# Patient Record
Sex: FEMALE | Race: WHITE | NOT HISPANIC OR LATINO | Employment: FULL TIME | URBAN - METROPOLITAN AREA
[De-identification: names, ages, dates, MRNs, and addresses within clinical notes are randomized per-mention and may not be internally consistent; named-entity substitution may affect disease eponyms.]

---

## 2023-12-19 RX ORDER — HYDROGEN PEROXIDE 3 %
1 SOLUTION, NON-ORAL MISCELLANEOUS DAILY
COMMUNITY
Start: 2017-02-07

## 2023-12-19 RX ORDER — OMEPRAZOLE 20 MG/1
CAPSULE, DELAYED RELEASE ORAL EVERY 24 HOURS
COMMUNITY
Start: 2016-04-13

## 2023-12-19 RX ORDER — VENLAFAXINE HYDROCHLORIDE 150 MG/1
CAPSULE, EXTENDED RELEASE ORAL EVERY 24 HOURS
COMMUNITY

## 2023-12-19 RX ORDER — PHENYLPROPANOLAMINE/CLEMASTINE 75-1.34MG
TABLET, EXTENDED RELEASE ORAL
COMMUNITY

## 2023-12-19 RX ORDER — CLINDAMYCIN PHOSPHATE AND TRETINOIN GEL 1.2%/0.025% 10; .25 MG/G; MG/G
GEL TOPICAL EVERY 24 HOURS
COMMUNITY
Start: 2014-10-08

## 2023-12-19 RX ORDER — GABAPENTIN 600 MG/1
TABLET ORAL EVERY 24 HOURS
COMMUNITY

## 2023-12-19 RX ORDER — MELATONIN 10 MG
CAPSULE ORAL
COMMUNITY

## 2023-12-19 RX ORDER — LEVONORGESTREL 52 MG/1
INTRAUTERINE DEVICE INTRAUTERINE
COMMUNITY

## 2023-12-19 RX ORDER — MONTELUKAST SODIUM 10 MG/1
TABLET ORAL EVERY 24 HOURS
COMMUNITY

## 2023-12-19 RX ORDER — PAROXETINE 10 MG/1
TABLET, FILM COATED ORAL
COMMUNITY
Start: 2016-11-22

## 2023-12-19 RX ORDER — PAROXETINE HYDROCHLORIDE 40 MG/1
TABLET, FILM COATED ORAL EVERY 24 HOURS
COMMUNITY
Start: 2016-09-23

## 2023-12-19 RX ORDER — PANTOPRAZOLE SODIUM 40 MG/1
TABLET, DELAYED RELEASE ORAL EVERY 24 HOURS
COMMUNITY

## 2023-12-21 ENCOUNTER — APPOINTMENT (OUTPATIENT)
Dept: PRIMARY CARE | Facility: CLINIC | Age: 38
End: 2023-12-21
Payer: COMMERCIAL

## 2024-01-03 ENCOUNTER — ALLIED HEALTH (OUTPATIENT)
Dept: INTEGRATIVE MEDICINE | Facility: CLINIC | Age: 39
End: 2024-01-03

## 2024-01-03 DIAGNOSIS — M54.59 OTHER LOW BACK PAIN: Primary | ICD-10-CM

## 2024-01-03 PROCEDURE — 97139 UNLISTED THERAPEUTIC PX: CPT | Performed by: ACUPUNCTURIST

## 2024-01-03 NOTE — PROGRESS NOTES
Acupuncture Visit:     Subjective   Patient ID: Ines Jones is a 38 y.o. female who presents for No chief complaint on file.  Seeking support for right sided low back pain.          Session Information  Is this acupuncture treatment being billed to the patient's insurance company: No  Visit Type: New patient         Review of Systems         Provider reviewed plan for the acupuncture session, precautions and contraindications. Patient/guardian/hospital staff has given consent to treat with full understanding of what to expect during the session. Before acupuncture began, provider explained to the patient to communicate at any time if the procedure was causing discomfort past their tolerance level. Patient agreed to advise acupuncturist. The acupuncturist counseled the patient on the risks of acupuncture treatment including pain, infection, bleeding, and no relief of pain. The patient was positioned comfortably. There was no evidence of infection at the site of needle insertions.    Objective   Physical Exam         Treatment Plan  Treatment Goals: Pain management    Acupuncture Treatment  Patient Position: Seated and reclining  Needle Guage: 36 guage /.20/ Blue seirin  Body Points: With retention  Body Points - Bilateral: 2nd danette,  Body Points - Right: ub 23, 24, km adrenalx3, sijtx2, Hjjl2  Needle Count In: 10  Needle Count Out: 10  Needle Retention Time (min): 25 minutes  Total Face to Face Time (min): 20 minutes              Assessment/Plan

## 2024-01-05 ENCOUNTER — APPOINTMENT (OUTPATIENT)
Dept: PRIMARY CARE | Facility: CLINIC | Age: 39
End: 2024-01-05
Payer: COMMERCIAL

## 2024-01-10 ENCOUNTER — APPOINTMENT (OUTPATIENT)
Dept: INTEGRATIVE MEDICINE | Facility: CLINIC | Age: 39
End: 2024-01-10
Payer: COMMERCIAL

## 2024-01-24 ENCOUNTER — APPOINTMENT (OUTPATIENT)
Dept: INTEGRATIVE MEDICINE | Facility: CLINIC | Age: 39
End: 2024-01-24
Payer: COMMERCIAL

## 2024-01-31 ENCOUNTER — APPOINTMENT (OUTPATIENT)
Dept: INTEGRATIVE MEDICINE | Facility: CLINIC | Age: 39
End: 2024-01-31
Payer: COMMERCIAL

## 2024-02-07 ENCOUNTER — HOSPITAL ENCOUNTER (OUTPATIENT)
Dept: RADIOLOGY | Facility: CLINIC | Age: 39
Discharge: HOME | End: 2024-02-07
Payer: COMMERCIAL

## 2024-02-07 DIAGNOSIS — M53.86 SCIATICA ASSOCIATED WITH DISORDER OF LUMBAR SPINE: ICD-10-CM

## 2024-02-07 PROCEDURE — 72110 X-RAY EXAM L-2 SPINE 4/>VWS: CPT

## 2024-02-07 PROCEDURE — 72110 X-RAY EXAM L-2 SPINE 4/>VWS: CPT | Performed by: RADIOLOGY

## 2024-02-22 ENCOUNTER — OFFICE VISIT (OUTPATIENT)
Dept: OTOLARYNGOLOGY | Facility: CLINIC | Age: 39
End: 2024-02-22
Payer: COMMERCIAL

## 2024-02-22 VITALS — HEIGHT: 60 IN | BODY MASS INDEX: 29.41 KG/M2 | TEMPERATURE: 97.4 F | WEIGHT: 149.8 LBS

## 2024-02-22 DIAGNOSIS — J33.9 NASAL POLYP: ICD-10-CM

## 2024-02-22 DIAGNOSIS — R09.81 NASAL CONGESTION: ICD-10-CM

## 2024-02-22 DIAGNOSIS — J32.9 CHRONIC RHINOSINUSITIS: Primary | ICD-10-CM

## 2024-02-22 DIAGNOSIS — J34.89 NASAL DRAINAGE: ICD-10-CM

## 2024-02-22 PROCEDURE — 1036F TOBACCO NON-USER: CPT | Performed by: NURSE PRACTITIONER

## 2024-02-22 PROCEDURE — 31231 NASAL ENDOSCOPY DX: CPT | Performed by: NURSE PRACTITIONER

## 2024-02-22 PROCEDURE — 99204 OFFICE O/P NEW MOD 45 MIN: CPT | Performed by: NURSE PRACTITIONER

## 2024-02-22 RX ORDER — BREXPIPRAZOLE 0.5 MG/1
0.5 TABLET ORAL DAILY
COMMUNITY
Start: 2024-02-05

## 2024-02-22 RX ORDER — VILOXAZINE HYDROCHLORIDE 200 MG/1
CAPSULE, EXTENDED RELEASE ORAL
COMMUNITY
Start: 2024-02-18

## 2024-02-22 ASSESSMENT — PATIENT HEALTH QUESTIONNAIRE - PHQ9
SUM OF ALL RESPONSES TO PHQ9 QUESTIONS 1 AND 2: 0
1. LITTLE INTEREST OR PLEASURE IN DOING THINGS: NOT AT ALL
2. FEELING DOWN, DEPRESSED OR HOPELESS: NOT AT ALL

## 2024-02-23 RX ORDER — FLUTICASONE PROPIONATE 93 UG/1
SPRAY, METERED NASAL
Qty: 16 ML | Refills: 3 | Status: SHIPPED | OUTPATIENT
Start: 2024-02-23

## 2024-02-23 NOTE — PATIENT INSTRUCTIONS
Use Xhance nasal spray 2 sprays each nostril once daily.  Use at least a saline spray to clean the nose prior to using Xhance.  Follow-up in 3 months.  We will follow nasal polyps and chronic rhinosinusitis protocol if this is not improved in 3 months.

## 2024-02-23 NOTE — PROGRESS NOTES
Subjective   Patient ID: Ines Jones is a 38 y.o. female who presents for Recurrent Sinusitis.  HPI  Patient reports that she has had issues with chronic rhinosinusitis since her adolescence years.  She has had nasal surgery done by Dr. Bernard and  several years ago.  She did very well for a period of at least 10 years but since this past year she has had 3 sinus infection in 1 year.  She has nasal congestion and nasal drainage.  She seems to be worse when she is using saline irrigation.  She feels that the water is getting trapped in the nose and harboring the bacteria that causes sinus infection.  She has tried several oral antihistamine as well as topical steroid nasal sprays which did not help.  She did have immunotherapy in the past and thinking about restarting the IT injections.        Review of Systems      All other systems have been reviewed and are negative for complaints except for those mentioned in history of present illness, past medical history and problem list       Objective   Physical Exam    CONSTITUTIONAL: No acute distress, normal facial features; No fever; no chills  VOICE: No hoarseness or other audible abnormality  RESPIRATION: Breathing comfortably, no stridor; normal breathing effort  CV: No cyanosis visible on the face and neck area  EYES:Pupils equal and round ; no erythema; conjunctiva clear; sclera white  NEURO: Alert and oriented, able to raise eyebrows symmetrical bilateral, smile with no facial droop, able to swallow  HEAD AND FACE: Symmetric facial features, no masses or lesions    Right ear examination: External ear normal.  EAC is clear.  TM intact with no effusion, retraction or perforation.  Left ear examination: External ear normal.  EAC is clear.  TM intact with no effusion, retraction or perforation.    NOSE: External nose midline; inferior nasal turbinates mildly hypertrophied.  Small polyp seen anterior to the middle turbinate.  No purulence.  ORAL CAVITY: No  lesions of external lips; uvula is midline; tongue with good mobility; no gross mass in oral cavity; mucosa appears pink   NECK/LYMPH: No obvious deformity or lesions; trachea is midline  PSYCH: Alert and oriented with appropriate mood and affect.    Patient ID: Ines Jones is a 38 y.o. female.    Procedures    PROCEDURE NOTE:   Procedure: Nasal endoscopy - diagnostic  Indication: concern for chronic sinusitis  Informed Consent obtained: risks, benefits, alternatives, and expectations discussed with pt and the pt wishes to proceed.     Findings: After topical decongestion with decongestant and anesthetic spray, nasal endoscopy was performed using an endoscope. The septum was midline hypertrophied. The inferior turbinates were hypertrophied.  The middle turbinates appeared , surgical changes noted bilaterally.  Nasal polyps noted mostly superiorly above the middle meatus as well as by the frontal sinus.  No purulence seen.  Significant postnasal drainage noted.  The nasal passageway is moderately obstructed by nasal polyps.  The nasopharynx was clear. There were no complications and the patient tolerated the procedure well.         Assessment/Plan       1. Chronic rhinosinusitis        2. Nasal polyp        3. Nasal congestion        4. Nasal drainage          Her nasal endoscopy exam showed significant polyps in bilateral nasal passage mostly by the frontal sinus as well as above the middle middle turbinates.  Noted sinus surgery changes in bilateral nasal passage.  We discussed treatment with tapered prednisone but patient deferred.  I then recommended using Xhance nasal spray which she is more agreeable to use.  She will use Xhance 2 sprays each nostril once daily for 3 months.  Encourage patient to use saline spray in each nostril once daily prior to application of Xhance.  If the nasal polyps are not improved over 3 months then we will follow sinus protocol.    This note was created using speech  recognition transcription software. Despite proofreading, several typographical errors might be present that might affect the meaning of the content. Please call with any questions.           SORAIDA Oden 02/23/24 11:31 AM

## 2024-03-01 ENCOUNTER — OFFICE VISIT (OUTPATIENT)
Dept: ORTHOPEDIC SURGERY | Facility: CLINIC | Age: 39
End: 2024-03-01
Payer: COMMERCIAL

## 2024-03-01 VITALS — BODY MASS INDEX: 27.48 KG/M2 | HEIGHT: 60 IN | WEIGHT: 140 LBS

## 2024-03-01 DIAGNOSIS — M43.06 PARS DEFECT OF LUMBAR SPINE: Primary | ICD-10-CM

## 2024-03-01 DIAGNOSIS — M54.16 LUMBAR RADICULOPATHY: ICD-10-CM

## 2024-03-01 DIAGNOSIS — Q76.49 BERTOLOTTI'S SYNDROME: ICD-10-CM

## 2024-03-01 PROCEDURE — 1036F TOBACCO NON-USER: CPT | Performed by: ORTHOPAEDIC SURGERY

## 2024-03-01 PROCEDURE — 99204 OFFICE O/P NEW MOD 45 MIN: CPT | Performed by: ORTHOPAEDIC SURGERY

## 2024-03-01 ASSESSMENT — PAIN - FUNCTIONAL ASSESSMENT: PAIN_FUNCTIONAL_ASSESSMENT: 0-10

## 2024-03-01 NOTE — LETTER
March 5, 2024     Michael Sinclair DO  [address] [facility] [address]  [facility] [address]    Patient: Ines Jones   YOB: 1985   Date of Visit: 3/1/2024       Dear Dr. Michael Sinclair DO:    Thank you for referring Ines Jones to me for evaluation. Below are my notes for this consultation.  If you have questions, please do not hesitate to call me. I look forward to following your patient along with you.       Sincerely,     Dylan Ramos MD      CC: No Recipients  ______________________________________________________________________________________    HPI:Ines Jones is a pleasant 38-year-old woman who comes in with complaints of low back pain.  Is predominant on the right side.  It comes and goes.  She has been using a sacroiliac brace which has been helpful.  She denies any significant and/or persistent radicular pain.  She has done 10 sessions of physical therapy and has seen a chiropractor in the past.  She has not had steroid injections.      ROS:  Reviewed on EMR and patient intake sheet.    PMH/SH:  Reviewed on EMR and patient intake sheet.    Exam:  Physical Exam    Constitutional: Well appearing; no acute distress  Eyes: pupils are equal and round  Psych: normal affect  Respiratory: non-labored breathing  Cardiovascular: regular rate and rhythm  GI: non-distended abdomen  Musculoskeletal: no pain with range of motion of the hips bilaterally  Neurologic: [5]/5 strength in the lower extremities bilaterally]; [negative] straight leg raise    Radiology:     X-rays were personally reviewed.  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  At the cephalad level, she has a chronic L5 pars defect with no significant spondylolisthesis.    Diagnosis:    L5 pars defect; Bertolotti syndrome    Assessment and Plan:   38-year-old woman with chronic axial back pain secondary to a combination of a transitional lumbosacral vertebrae, and a chronic pars defect.  At this time  she is not having significant radicular pain.  As result I encouraged continued nonoperative management with a focus on continued core strengthening, plus potential steroid injections to calm down her pain.    We discussed the role of pain management and steroid injections.  This included a brief overview of the injection procedure itself, the rationale behind this procedure, and the appropriate expectations for treatment of the patient's pain.  A referral to a pain management specialist was offered to the patient.    If this patient develops intractable radicular pain in the future, then and only then, with surgical intervention would be advisable.  I can see her back as needed.  She was appreciative for the conservative nonoperative approach    The patient was in agreement with the plan. At the end of the visit today, the patient felt that all questions had been answered satisfactorily.  The patient was pleased with the visit and very appreciative for the care rendered.     Thank you very much for the kind referral.  It is a privilege, and a pleasure, to partner with you in the care of your patients.  I would be delighted to assist you with any further consultations as needed.          Dylan Ramos MD    Chief of Spine Surgery, [facility]  Director of Spine Service, [facility]  , Department of Orthopaedics  [facility]  [address] [place] [address]  P: [phone]  [employer]    This note was dictated with voice recognition software.  It has not been proofread for grammatical errors, typographical mistakes or other semantic inconsistencies.

## 2024-03-05 NOTE — PROGRESS NOTES
HPI:Ines Jones is a pleasant 38-year-old woman who comes in with complaints of low back pain.  Is predominant on the right side.  It comes and goes.  She has been using a sacroiliac brace which has been helpful.  She denies any significant and/or persistent radicular pain.  She has done 10 sessions of physical therapy and has seen a chiropractor in the past.  She has not had steroid injections.      ROS:  Reviewed on EMR and patient intake sheet.    PMH/SH:  Reviewed on EMR and patient intake sheet.    Exam:  Physical Exam    Constitutional: Well appearing; no acute distress  Eyes: pupils are equal and round  Psych: normal affect  Respiratory: non-labored breathing  Cardiovascular: regular rate and rhythm  GI: non-distended abdomen  Musculoskeletal: no pain with range of motion of the hips bilaterally  Neurologic: [5]/5 strength in the lower extremities bilaterally]; [negative] straight leg raise    Radiology:     X-rays were personally reviewed.  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  At the cephalad level, she has a chronic L5 pars defect with no significant spondylolisthesis.    Diagnosis:    L5 pars defect; Bertolotti syndrome    Assessment and Plan:   38-year-old woman with chronic axial back pain secondary to a combination of a transitional lumbosacral vertebrae, and a chronic pars defect.  At this time she is not having significant radicular pain.  As result I encouraged continued nonoperative management with a focus on continued core strengthening, plus potential steroid injections to calm down her pain.    We discussed the role of pain management and steroid injections.  This included a brief overview of the injection procedure itself, the rationale behind this procedure, and the appropriate expectations for treatment of the patient's pain.  A referral to a pain management specialist was offered to the patient.    If this patient develops intractable radicular pain in the future, then  and only then, with surgical intervention would be advisable.  I can see her back as needed.  She was appreciative for the conservative nonoperative approach    The patient was in agreement with the plan. At the end of the visit today, the patient felt that all questions had been answered satisfactorily.  The patient was pleased with the visit and very appreciative for the care rendered.     Thank you very much for the kind referral.  It is a privilege, and a pleasure, to partner with you in the care of your patients.  I would be delighted to assist you with any further consultations as needed.          Dylan Ramos MD    Chief of Spine Surgery, [facility]  Director of Spine Service, [facility]  , Department of Orthopaedics  [facility]  [address] [place] [address]  P: [phone]  [facility]    This note was dictated with voice recognition software.  It has not been proofread for grammatical errors, typographical mistakes or other semantic inconsistencies.

## 2024-04-18 ENCOUNTER — OFFICE VISIT (OUTPATIENT)
Dept: PAIN MEDICINE | Facility: CLINIC | Age: 39
End: 2024-04-18
Payer: COMMERCIAL

## 2024-04-18 VITALS
BODY MASS INDEX: 27.48 KG/M2 | WEIGHT: 140 LBS | RESPIRATION RATE: 18 BRPM | HEART RATE: 89 BPM | HEIGHT: 60 IN | DIASTOLIC BLOOD PRESSURE: 72 MMHG | SYSTOLIC BLOOD PRESSURE: 108 MMHG

## 2024-04-18 DIAGNOSIS — M47.818 ARTHROPATHY OF RIGHT SACROILIAC JOINT: ICD-10-CM

## 2024-04-18 DIAGNOSIS — D17.79 LIPOMA OF OTHER SPECIFIED SITES: Primary | ICD-10-CM

## 2024-04-18 DIAGNOSIS — M79.18 MYOFASCIAL PAIN: ICD-10-CM

## 2024-04-18 PROCEDURE — 99204 OFFICE O/P NEW MOD 45 MIN: CPT | Performed by: STUDENT IN AN ORGANIZED HEALTH CARE EDUCATION/TRAINING PROGRAM

## 2024-04-18 PROCEDURE — 1036F TOBACCO NON-USER: CPT | Performed by: STUDENT IN AN ORGANIZED HEALTH CARE EDUCATION/TRAINING PROGRAM

## 2024-04-18 PROCEDURE — 99214 OFFICE O/P EST MOD 30 MIN: CPT | Performed by: STUDENT IN AN ORGANIZED HEALTH CARE EDUCATION/TRAINING PROGRAM

## 2024-04-18 ASSESSMENT — PATIENT HEALTH QUESTIONNAIRE - PHQ9
1. LITTLE INTEREST OR PLEASURE IN DOING THINGS: NOT AT ALL
SUM OF ALL RESPONSES TO PHQ9 QUESTIONS 1 AND 2: 0
2. FEELING DOWN, DEPRESSED OR HOPELESS: NOT AT ALL

## 2024-04-18 ASSESSMENT — PAIN - FUNCTIONAL ASSESSMENT: PAIN_FUNCTIONAL_ASSESSMENT: 0-10

## 2024-04-18 ASSESSMENT — PAIN SCALES - GENERAL
PAINLEVEL: 2
PAINLEVEL_OUTOF10: 2

## 2024-04-18 ASSESSMENT — LIFESTYLE VARIABLES: TOTAL SCORE: 2

## 2024-04-18 NOTE — PROGRESS NOTES
[facility] Pain Management  New Patient Office Visit Note 2024    Patient Information: Ines Jones, MRN: [id number], : 1985   Primary Care/Referring Physician: Michael Sinclair DO, [id number] [facility] [address] / [place] [address]     Chief Complaint: Right low back/buttock pain  History of Pain: Ms. Ines Jones is a 38 y.o. female with no significant PMHx who presents for low back/buttock pain, primarily on the right    She reports onset of pain a few years ago which has been gradually worsening. She describes bilateral low back and buttock pain, which is much more severe on the right. She currently denies any pain radiating down her legs, but occasionally has radiating pain down her right posterior buttock and thigh to the level of the knee. She denies any tingling or numbness in the legs. She reports some difficulty sleeping at night due to pain. She reports worsening of pain with prolonged standing and walking.     She recently had a lumbar spine xray which shows pars defect at L5 and sacralization of L5    Current Pain Medications: None currently, but very occasionally takes OTC Ibuprofen (400-800 mg when pain worsens) or Tylenol  Previously Tried Pain Medications: Diclofenac, PO Prednisone    Relevant Surgeries: Denies  Injections: Denies  Physical/Occupational Therapy: She has done physical therapy and chiropractic treatment without much pain relief    Medications:   Current Outpatient Medications   Medication Instructions    clindamycin-tretinoin (Ziana) gel Every 24 hours    esomeprazole (NexIUM) 20 mg DR capsule 1 capsule, oral, Daily    fluticasone propionate (Xhance) 93 mcg/actuation aerosol breath activated Use 2 sprays each nostrils once daily for 3 months.    gabapentin (Neurontin) 600 mg tablet Every 24 hours    ibuprofen (Motrin) capsule oral    levonorgestrel (Mirena) 21 mcg/24 hours (8 yrs) 52 mg IUD intrauterine    melatonin 10 mg capsule oral    montelukast (Singulair) 10 mg tablet  Every 24 hours    omeprazole (PriLOSEC) 20 mg DR capsule Every 24 hours    pantoprazole (ProtoNix) 40 mg EC tablet Every 24 hours    PARoxetine (Paxil) 10 mg tablet oral    PARoxetine (Paxil) 40 mg tablet Every 24 hours    Qelbree 200 mg capsule,extended release 24hr     Rexulti 0.5 mg, oral, Daily    venlafaxine XR (Effexor-XR) 150 mg 24 hr capsule Every 24 hours      Allergies:   Allergies   Allergen Reactions    House Dust Mite Unknown    Mold Unknown    Penicillins Rash    Sulfamethoxazole Rash       Past Medical & Surgical History:  Past Medical History:   Diagnosis Date    Personal history of other diseases of the nervous system and sense organs     History of sleep apnea    Personal history of other mental and behavioral disorders     History of depression      Past Surgical History:   Procedure Laterality Date    OTHER SURGICAL HISTORY  08/18/2015    Dental Surgery    OTHER SURGICAL HISTORY  04/19/2016    Simple Excision Of Nasal Polyp       No family history on file.  Social History     Socioeconomic History    Marital status: Single     Spouse name: Not on file    Number of children: Not on file    Years of education: Not on file    Highest education level: Not on file   Occupational History    Not on file   Tobacco Use    Smoking status: Never    Smokeless tobacco: Never   Substance and Sexual Activity    Alcohol use: Not Currently    Drug use: Never    Sexual activity: Not on file   Other Topics Concern    Not on file   Social History Narrative    Not on file     Social Determinants of Health     Financial Resource Strain: Low Risk  (7/13/2023)    Received from [facility]    Overall Financial Resource Strain (CARDIA)     Difficulty of Paying Living Expenses: Not very hard   Food Insecurity: No Food Insecurity (7/13/2023)    Received from [facility]    Hunger Vital Sign     Worried About Running Out of Food in the Last Year: Never true     Ran Out of Food in the Last Year: Never true    Transportation Needs: No Transportation Needs (7/13/2023)    Received from [facility]    PRAPARE - Transportation     Lack of Transportation (Medical): No     Lack of Transportation (Non-Medical): No   Physical Activity: Sufficiently Active (7/13/2023)    Received from [facility]    Exercise Vital Sign     Days of Exercise per Week: 5 days     Minutes of Exercise per Session: 30 min   Stress: Stress Concern Present (7/13/2023)    Received from [facility]    [employer] [place] [employer] - Occupational Stress Questionnaire     Feeling of Stress : To some extent   Social Connections: Socially Isolated (7/13/2023)    Received from [facility]    Social Connection and Isolation Panel [NHANES]     Frequency of Communication with Friends and Family: Once a week     Frequency of Social Gatherings with Friends and Family: Once a week     Attends Worship Services: Never     Active Member of Clubs or Organizations: Yes     Attends Club or Organization Meetings: 1 to 4 times per year     Marital Status: Never    Intimate Partner Violence: Not At Risk (7/13/2023)    Received from [facility]    Humiliation, Afraid, Rape, and Kick questionnaire     Fear of Current or Ex-Partner: No     Emotionally Abused: No     Physically Abused: No     Sexually Abused: No   Housing Stability: Low Risk  (7/13/2023)    Received from [facility]    Housing Stability Vital Sign     Unable to Pay for Housing in the Last Year: No     Number of Places Lived in the Last Year: 1     Unstable Housing in the Last Year: No       Problems, Past medical history, past surgical history, Medications, allergies, social and family history reviewed and as per the electronic medical record from today's encounter    Review of Systems:  CONST: No fever, chills, fatigue, weight changes  EYES: No loss of vision  ENT: No hearing loss, tinnitus  CV: No chest pain, palpitations  RESP: No dyspnea, shortness of breath,  "cough  GI: No stool incontinence, nausea, vomiting  : No urinary incontinence  MSK: No joint swelling  SKIN: No rash, no hives  NEURO: No headache, dizziness, weakness, paresthesias  PSYCH: No anxiety, depression  HEM/LYMPH: No easy bruising or bleeding  All other systems reviewed are negative     Physical Exam:  Vitals: /72   Pulse 89   Resp 18   Ht 1.524 m (5')   Wt 63.5 kg (140 lb)   BMI 27.34 kg/m²   General: No apparent distress. Alert, appropriate, oriented x 3. Mood and affect normal. Speaking in full sentences.  HENT: Normocephalic, atraumatic. Hearing intact.  Eyes: Extraocular movements grossly intact. Pupils equal and round.   Neck: Supple, trachea midline.  Lungs: Symmetric respiratory excursion on visual exam, nonlabored breathing.  Extremities: No clubbing, cyanosis, or edema noted in arms or legs.  Skin: No rashes, lesions, alopecia noted on back or extremities.   Back: Flexion, extension, rotation does not exacerbate pain. Painful fibro-fatty nodule appreciated in right low back/buttock.  No tenderness over the spinous processes, lumbar facets, SIJ, or GTB bilaterally. RAF test and Gaenslen's test negative bilaterally. No spasm noted over musculature. No misalignment or asymmetry noted.  Neuro: Alert and appropriate. Motor strength 5/5 throughout bilateral lower extremities. Sensory intact to light touch bilateral lower extremities. Gait within normal limits. Bulk and tone within normal limits.    Laboratory Data:  The following laboratory data were reviewed during this visit:   Lab Results   Component Value Date    WBC 9.3 08/02/2018    RBC 3.97 (L) 08/02/2018    HGB 12.9 08/02/2018    HCT 39.8 08/02/2018     08/02/2018      No results found for: \"INR\"  No results found for: \"CREATININE\", \"CRCLEARANCE\", \"HGBA1C\"    Imaging:  The following imaging impressions were reviewed by me during this visit:    -2/7/24 lumbar spine xray shows L5 spondylolysis bilaterally. There is " anterolisthesis measuring 4-5 mm. There is mild disc space narrowing at L5-S1 and L4-5. The lumbar vertebral heights are maintained. There are mild changes of sacroiliac osteoarthritis bilaterally. There is sacralization of L5     I also personally reviewed the images from the above studies myself. These images and my interpretation of them contributed to the management and decision making of the patient's medical plan.    ASSESSMENT:  Ms. Ines Jones is a 38 y.o. female with low back pain (R<<L) that is consistent with:    1. Lipoma of other specified sites    2. Myofascial pain    3. Arthropathy of right sacroiliac joint        PLAN:    Diagnostics:   - I reviewed her lumbar spine xray which shows L5 pars defect.  No additional diagnostics at this time    Physical Therapy and Rehabilitation:     - She has done PT and chiropractic manipulation without durable pain relief. She is considering trying acupuncture which I believe is reasonable    Psychologically:  - No needs at this time    Medications  - Recommend rotating to OTC Naproxen to see if this is more efficacious than Ibuprofen for when her pain flares    Duration  - Multiple years    Interventions:  - I suspect much of her pain is secondary to painful fibro-fatty nodule in the right low back/buttock. While her xray does show a pars defect at L5 and sacroiliitis, her history and physical are unremarkable for pain coming from these structures. If her pain worsens I would consider ultrasound guided trigger point injections into the fibrofatty nodule. If this does not help I would consider a right SI joint injection for diagnostic and therapeutic purposes        Sincerely,  Alexis Patrick MD  [facility] - [place]

## 2024-04-18 NOTE — PATIENT INSTRUCTIONS
-I would consider a trigger point injection if your pain worsens. The billing code (CPT code) for this is 80314 if you would like to speak with your insurance company about the cost.

## 2024-10-16 ENCOUNTER — HOSPITAL ENCOUNTER (OUTPATIENT)
Dept: RADIOLOGY | Facility: CLINIC | Age: 39
Discharge: HOME | End: 2024-10-16
Payer: COMMERCIAL

## 2024-10-16 DIAGNOSIS — M43.16 SPONDYLOLISTHESIS, LUMBAR REGION: ICD-10-CM

## 2024-10-16 DIAGNOSIS — M51.16 INTERVERTEBRAL DISC DISORDERS WITH RADICULOPATHY, LUMBAR REGION: ICD-10-CM

## 2024-10-16 PROCEDURE — 72148 MRI LUMBAR SPINE W/O DYE: CPT

## 2024-10-16 PROCEDURE — 72148 MRI LUMBAR SPINE W/O DYE: CPT | Performed by: RADIOLOGY

## 2024-10-21 ENCOUNTER — HOSPITAL ENCOUNTER (OUTPATIENT)
Dept: RADIOLOGY | Facility: CLINIC | Age: 39
Discharge: HOME | End: 2024-10-21
Payer: COMMERCIAL

## 2024-10-21 VITALS — BODY MASS INDEX: 26.7 KG/M2 | HEIGHT: 60 IN | WEIGHT: 136 LBS

## 2024-10-21 DIAGNOSIS — Z12.31 ENCOUNTER FOR SCREENING MAMMOGRAM FOR MALIGNANT NEOPLASM OF BREAST: ICD-10-CM

## 2024-10-21 PROCEDURE — 77067 SCR MAMMO BI INCL CAD: CPT

## 2024-10-29 ENCOUNTER — HOSPITAL ENCOUNTER (OUTPATIENT)
Dept: RADIOLOGY | Facility: EXTERNAL LOCATION | Age: 39
Discharge: HOME | End: 2024-10-29

## 2024-11-04 ENCOUNTER — APPOINTMENT (OUTPATIENT)
Dept: ORTHOPEDIC SURGERY | Facility: CLINIC | Age: 39
End: 2024-11-04
Payer: COMMERCIAL

## 2024-11-04 DIAGNOSIS — M43.06 PARS DEFECT OF LUMBAR SPINE: ICD-10-CM

## 2024-11-04 PROCEDURE — 1036F TOBACCO NON-USER: CPT | Performed by: PHYSICIAN ASSISTANT

## 2024-11-04 PROCEDURE — 99213 OFFICE O/P EST LOW 20 MIN: CPT | Performed by: PHYSICIAN ASSISTANT

## 2024-11-04 RX ORDER — METHOCARBAMOL 500 MG/1
500 TABLET, FILM COATED ORAL EVERY 12 HOURS PRN
Qty: 60 TABLET | Refills: 0 | Status: SHIPPED | OUTPATIENT
Start: 2024-11-04 | End: 2024-12-04

## 2024-11-04 ASSESSMENT — PAIN - FUNCTIONAL ASSESSMENT: PAIN_FUNCTIONAL_ASSESSMENT: 0-10

## 2024-11-04 ASSESSMENT — PAIN SCALES - GENERAL: PAINLEVEL_OUTOF10: 7

## 2024-11-04 NOTE — PROGRESS NOTES
Ines is a 39-year-old female that presents today to be evaluated for some chronic low back pain with occasional thoracolumbar muscle spasm and hip pain.    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  She denies any specific injury or fall to that but states that in years past she was skiing and knows that she has fallen a lot then.  Her primary care doctor referred her to come see us.    As far as her symptoms today, she describes chronic low back pain, muscular pain in the thoracolumbar spine with bilateral hip pain.  Again this is more muscular in nature, she does not have any lower extremity radiculopathy or weakness.  Full control of her bowel bladder and she is not dragging or tripping over her feet.    As far as treatment, she did physical therapy earlier in the year but nothing recent.    Family, social, and medical histories are obtained and reviewed.    I reviewed the complete 30-point review of systems that was documented on the scanned patient intake form.  All other systems are non-contributory except as defined in history of present illness.    Patient had x-rays and an MRI done prior to today.  Results were reviewed with her.  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  The MRI results revealed that she does not have any spinal stenosis, patient states that she was told that she had a tumor on her spine and then she also had cyst.  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    Moving forward, I discussed with the patient that this is something I would encourage her to continue treating very conservative.  With the type of work that she does I do believe that a very good stretching, strengthening and exercise program on a routine basis will definitely help her.  She was given a referral for water therapy as well as pain management and she will follow-up with us in about 6  to 8 weeks and keep us updated on how she is doing.    This note was dictated using speech recognition software and was not corrected for spelling or grammatical errors

## 2024-11-08 ENCOUNTER — APPOINTMENT (OUTPATIENT)
Dept: PAIN MEDICINE | Facility: CLINIC | Age: 39
End: 2024-11-08
Payer: COMMERCIAL

## 2024-11-11 NOTE — PROGRESS NOTES
[facility]  Follow Up Office Visit Note 11/15/2024    Patient Information: Ines Jones, MRN: [id number], : 1985   Primary Care/Referring Physician: Michael Sinclair DO, [address] / [place] [address]     Chief Complaint: Right low back/buttock pain    Interval History: Ms. Jones presents today for follow up    Today she reports ***    Brief History of Pain: Ms. Ines Jones is a 39 y.o. female with no significant PMHx who presents for low back/buttock pain, primarily on the right    She reports onset of pain a few years ago which has been gradually worsening. She describes bilateral low back and buttock pain, which is much more severe on the right. She currently denies any pain radiating down her legs, but occasionally has radiating pain down her right posterior buttock and thigh to the level of the knee. She denies any tingling or numbness in the legs. She reports some difficulty sleeping at night due to pain. She reports worsening of pain with prolonged standing and walking.     She recently had a lumbar spine xray which shows pars defect at L5 and sacralization of L5    Current Pain Medications: None currently, but very occasionally takes OTC Ibuprofen (400-800 mg when pain worsens) or Tylenol  Previously Tried Pain Medications: Diclofenac, PO Prednisone    Relevant Surgeries: Denies  Injections: Denies  Physical/Occupational Therapy: She has done physical therapy and chiropractic treatment without much pain relief    Medications:   Current Outpatient Medications   Medication Instructions    clindamycin-tretinoin (Ziana) gel Every 24 hours    esomeprazole (NexIUM) 20 mg DR capsule 1 capsule, oral, Daily    fluticasone propionate (Xhance) 93 mcg/actuation aerosol breath activated Use 2 sprays each nostrils once daily for 3 months.    gabapentin (Neurontin) 600 mg tablet Every 24 hours    ibuprofen (Motrin) capsule oral    levonorgestrel (Mirena) 21 mcg/24 hours (8 yrs) 52 mg  IUD intrauterine    melatonin 10 mg capsule oral    methocarbamol (ROBAXIN) 500 mg, oral, Every 12 hours PRN    montelukast (Singulair) 10 mg tablet Every 24 hours    omeprazole (PriLOSEC) 20 mg DR capsule Every 24 hours    pantoprazole (ProtoNix) 40 mg EC tablet Every 24 hours    PARoxetine (Paxil) 10 mg tablet oral    PARoxetine (Paxil) 40 mg tablet Every 24 hours    Qelbree 200 mg capsule,extended release 24hr     Rexulti 0.5 mg, oral, Daily    venlafaxine XR (Effexor-XR) 150 mg 24 hr capsule Every 24 hours      Allergies:   Allergies   Allergen Reactions    House Dust Mite Unknown    Mold Unknown    Penicillins Rash    Sulfamethoxazole Rash       Past Medical & Surgical History:  Past Medical History:   Diagnosis Date    Personal history of other diseases of the nervous system and sense organs     History of sleep apnea    Personal history of other mental and behavioral disorders     History of depression      Past Surgical History:   Procedure Laterality Date    OTHER SURGICAL HISTORY  08/18/2015    Dental Surgery    OTHER SURGICAL HISTORY  04/19/2016    Simple Excision Of Nasal Polyp       Family History   Problem Relation Name Age of Onset    Breast cancer Mother  40     Social History     Socioeconomic History    Marital status: Single     Spouse name: Not on file    Number of children: Not on file    Years of education: Not on file    Highest education level: Not on file   Occupational History    Not on file   Tobacco Use    Smoking status: Never    Smokeless tobacco: Never   Substance and Sexual Activity    Alcohol use: Not Currently    Drug use: Never    Sexual activity: Not on file   Other Topics Concern    Not on file   Social History Narrative    Not on file     Social Drivers of Health     Financial Resource Strain: Low Risk  (8/26/2024)    Received from [facility]    Overall Financial Resource Strain (CARDIA)     Difficulty of Paying Living Expenses: Not very hard   Food Insecurity: No Food  Insecurity (8/26/2024)    Received from [facility]    Hunger Vital Sign     Worried About Running Out of Food in the Last Year: Never true     Ran Out of Food in the Last Year: Never true   Transportation Needs: No Transportation Needs (8/26/2024)    Received from [facility]    PRAPARE - Transportation     Lack of Transportation (Medical): No     Lack of Transportation (Non-Medical): No   Physical Activity: Sufficiently Active (8/26/2024)    Received from [facility]    Exercise Vital Sign     Days of Exercise per Week: 5 days     Minutes of Exercise per Session: 30 min   Stress: Stress Concern Present (8/26/2024)    Received from [facility]    [employer] - Occupational Stress Questionnaire     Feeling of Stress : Very much   Social Connections: Moderately Isolated (8/26/2024)    Received from [facility]    Social Connection and Isolation Panel [NHANES]     Frequency of Communication with Friends and Family: Once a week     Frequency of Social Gatherings with Friends and Family: Once a week     Attends Mandaeism Services: More than 4 times per year     Active Member of Clubs or Organizations: Yes     Attends Club or Organization Meetings: More than 4 times per year     Marital Status: Never    Intimate Partner Violence: Not At Risk (7/13/2023)    Received from [facility], [facility]    Humiliation, Afraid, Rape, and Kick questionnaire     Fear of Current or Ex-Partner: No     Emotionally Abused: No     Physically Abused: No     Sexually Abused: No   Housing Stability: Low Risk  (8/26/2024)    Received from [facility]    Housing Stability Vital Sign     Unable to Pay for Housing in the Last Year: No     Number of Times Moved in the Last Year: 0     Homeless in the Last Year: No       Problems, Past medical history, past surgical history, Medications, allergies, social and family history reviewed and as per the electronic medical record from  "today's encounter    Review of Systems:  CONST: No fever, chills, fatigue, weight changes  EYES: No loss of vision  ENT: No hearing loss, tinnitus  CV: No chest pain, palpitations  RESP: No dyspnea, shortness of breath, cough  GI: No stool incontinence, nausea, vomiting  : No urinary incontinence  MSK: No joint swelling  SKIN: No rash, no hives  NEURO: No headache, dizziness, weakness, paresthesias  PSYCH: No anxiety, depression  HEM/LYMPH: No easy bruising or bleeding  All other systems reviewed are negative     Physical Exam:  Vitals: There were no vitals taken for this visit.  General: No apparent distress. Alert, appropriate, oriented x 3. Mood and affect normal. Speaking in full sentences.  HENT: Normocephalic, atraumatic. Hearing intact.  Eyes: Extraocular movements grossly intact. Pupils equal and round.   Neck: Supple, trachea midline.  Lungs: Symmetric respiratory excursion on visual exam, nonlabored breathing.  Extremities: No clubbing, cyanosis, or edema noted in arms or legs.  Skin: No rashes, lesions, alopecia noted on back or extremities.   Back: Flexion, extension, rotation does not exacerbate pain. Painful fibro-fatty nodule appreciated in right low back/buttock.  No tenderness over the spinous processes, lumbar facets, SIJ, or GTB bilaterally. RAF test and Gaenslen's test negative bilaterally. No spasm noted over musculature. No misalignment or asymmetry noted.  Neuro: Alert and appropriate. Motor strength 5/5 throughout bilateral lower extremities. Sensory intact to light touch bilateral lower extremities. Gait within normal limits. Bulk and tone within normal limits.    Laboratory Data:  The following laboratory data were reviewed during this visit:   Lab Results   Component Value Date    WBC 9.3 08/02/2018    RBC 3.97 (L) 08/02/2018    HGB 12.9 08/02/2018    HCT 39.8 08/02/2018     08/02/2018      No results found for: \"INR\"  No results found for: \"CREATININE\", \"CRCLEARANCE\", " "\"HGBA1C\"    Imaging:  The following imaging impressions were reviewed by me during this visit:    -2/7/24 lumbar spine xray shows L5 spondylolysis bilaterally. There is anterolisthesis measuring 4-5 mm. There is mild disc space narrowing at L5-S1 and L4-5. The lumbar vertebral heights are maintained. There are mild changes of sacroiliac osteoarthritis bilaterally. There is sacralization of L5     I also personally reviewed the images from the above studies myself. These images and my interpretation of them contributed to the management and decision making of the patient's medical plan.    ASSESSMENT:  Ms. Ines Jones is a 39 y.o. female with low back pain (R<<L) that is consistent with:    No diagnosis found.      PLAN:    Diagnostics:   - I reviewed her lumbar spine xray which shows L5 pars defect.  No additional diagnostics at this time    Physical Therapy and Rehabilitation:     - She has done PT and chiropractic manipulation without durable pain relief. She is considering trying acupuncture which I believe is reasonable    Psychologically:  - No needs at this time    Medications  - Recommend rotating to OTC Naproxen to see if this is more efficacious than Ibuprofen for when her pain flares    Duration  - Multiple years    Interventions:  - I suspect much of her pain is secondary to painful fibro-fatty nodule in the right low back/buttock. While her xray does show a pars defect at L5 and sacroiliitis, her history and physical are unremarkable for pain coming from these structures. If her pain worsens I would consider ultrasound guided trigger point injections into the fibrofatty nodule. If this does not help I would consider a right SI joint injection for diagnostic and therapeutic purposes        Sincerely,  Alexis Patrick MD  [facility] - [place]  "

## 2024-11-15 ENCOUNTER — APPOINTMENT (OUTPATIENT)
Dept: PAIN MEDICINE | Facility: CLINIC | Age: 39
End: 2024-11-15
Payer: COMMERCIAL

## 2024-12-04 ENCOUNTER — APPOINTMENT (OUTPATIENT)
Dept: PHYSICAL THERAPY | Facility: CLINIC | Age: 39
End: 2024-12-04
Payer: COMMERCIAL

## 2024-12-16 ENCOUNTER — APPOINTMENT (OUTPATIENT)
Dept: ORTHOPEDIC SURGERY | Facility: CLINIC | Age: 39
End: 2024-12-16
Payer: COMMERCIAL